# Patient Record
Sex: MALE | URBAN - METROPOLITAN AREA
[De-identification: names, ages, dates, MRNs, and addresses within clinical notes are randomized per-mention and may not be internally consistent; named-entity substitution may affect disease eponyms.]

---

## 2017-03-28 ENCOUNTER — OFFICE VISIT (OUTPATIENT)
Dept: PODIATRY | Facility: CLINIC | Age: 27
End: 2017-03-28
Payer: COMMERCIAL

## 2017-03-28 VITALS — BODY MASS INDEX: 23.62 KG/M2 | HEIGHT: 70 IN | HEART RATE: 86 BPM | WEIGHT: 165 LBS

## 2017-03-28 DIAGNOSIS — L60.0 INGROWING NAIL: Primary | ICD-10-CM

## 2017-03-28 PROCEDURE — 11730 AVULSION NAIL PLATE SIMPLE 1: CPT | Mod: T5 | Performed by: PODIATRIST

## 2017-03-28 PROCEDURE — 99203 OFFICE O/P NEW LOW 30 MIN: CPT | Mod: 25 | Performed by: PODIATRIST

## 2017-03-28 NOTE — PATIENT INSTRUCTIONS
DR. HEART'S CLINIC LOCATIONS:       Monday Tuesday   [facility]   [address] [address] [address] [facility] [address] [facility], [address]   [place] [address] [place] [address]   [phone]       Wednesday:  Surgery Day    Surgery Scheduling Line - [phone]       Thursday Morning Thursday Afternoon   [facility]   [address] [facility] [address] [address] [facility], [address]   [place] [address] [place] [address]   [phone]       Friday Morning To Schedule an Appointment    [facility] Call: [phone] [address] [place] [address] [phone] PLEASE FAX ALL FORMS TO: [phone]     Follow up: 2 weeks or as needed    INGROWN TOENAIL PROCEDURE INSTRUCTIONS  - After the procedure, go home and elevate the involved foot for the remainder of the day/evening as able.  This is to minimize swelling, control pain, and limit post-procedural complications.  The pre-procedural injection may cause your toe to be numb anywhere from1-2 hours.    - You can take Tylenol, Ibuprofen, Advil, etc as needed for pain if tolerated.  Follow label instructions      - If you have been given a prescription for antibiotics, take them as instructed and complete the prescription.    - Keep dressing intact until the following morning.  At that point, you may remove the bandage (you may need to soak it in warm soapy water as the bandage will likely adhere to your skin).  Soaking in warm soapy water for 5-10 minutes will also facilitate healing.  Wash the toe thoroughly, dry the toe thoroughly.  Apply antibiotic wound ointment to base of wound and cover with gauze and Coban dressing (not too tightly) until it stops draining.  This may take a few days to weeks, but at that point, you may continue with antibiotic ointment and a band-aid, or you may stop applying a dressing all together.  Dressing changes should  be done twice daily if you had the permanent/chemical procedure done.    - You may do activities to tolerance the following day.  Find a shoe that is comfortable and minimizes the amount of rubbing on your toe, as this may increase pain, swelling, etc.    - Monitor for signs of infection.  With this procedure, it is common to have mild surrounding redness and drainage.  If the redness involves the entire great toe or if you notice red streaks on top of your foot, or if you experience any nausea, vomiting, chills, fevers > 101 degrees, call clinic for a quick appointment.      Body Mass Index (BMI)    Many things can cause foot and ankle problems. Foot structure, activity level, foot mechanics and injuries are common causes of pain.    One very important issue that often goes unmentioned, is body weight.  Extra weight can cause increased stress on muscles, ligaments, bones and tendons. Sometimes just a few extra pounds is all it takes to put one over her/his threshold. Without reducing that stress, it can be difficult to alleviate pain.      Some people are uncomfortable addressing this issue, but we feel it is important for you to think about it. As Foot & Ankle specialists, our job is addressing the lower extremity problem and possible causes.     Regarding extra body weight, we encourage patients to discuss diet and weight management plans with their primary care doctors. It is this team approach that gives you the best opportunity for pain relief and getting you back on your feet.

## 2017-03-28 NOTE — PROGRESS NOTES
"Foot & Ankle Surgery  March 28, 2017    CC: R hallux pain    I was asked to see Jose Atwood regarding the chief complaint by:  none    HPI:  Pt is a 26 year old male who presents with above complaint.  R hallux pain x 3 weeks, thinks he has an ingrown nail.  He tried to remove this himself but thinks he may have caused an infection.  Pain 8/10 \"constantly\", brianna with pressure.  He's tried peroxide and soaking.  No history of ingrowns, infections or procedures previously    ROS:   Pos for CC.  The patient denies current nausea, vomiting, chills, fevers, belly pain, calf pain, chest pain or SOB.  Complete remainder of ROS is otherwise neg.    VITALS:    Vitals:    03/28/17 1407   Pulse: 86   Weight: 165 lb (74.8 kg)   Height: 5' 10\" (1.778 m)       PMH:  No past medical history on file.    SXHX:  No past surgical history on file.     MEDS:    No current outpatient prescriptions on file.     No current facility-administered medications for this visit.        ALL:   Not on File    FMH:  No family history on file.    SocHx:    Social History     Social History     Marital status: Single     Spouse name: N/A     Number of children: N/A     Years of education: N/A     Occupational History     Not on file.     Social History Main Topics     Smoking status: Never Smoker     Smokeless tobacco: Not on file     Alcohol use Not on file     Drug use: Not on file     Sexual activity: Not on file     Other Topics Concern     Not on file     Social History Narrative     No narrative on file           EXAMINATION:  Gen:   No apparent distress  Neuro:   A&Ox3, no deficits  Psych:    Answering questions appropriately for age and situation with normal affect  Head:    NCAT  Eye:    Visual scanning without deficit  Ear:    Response to auditory stimuli wnl  Lung:    Non-labored breathing on RA noted  Abd:    NTND per patient report  Lymph:    Neg for pitting/non-pitting edema BLE  Vasc:    Pulses palpable, CFT minimally delayed  Neuro:    " Light touch sensation intact to all sensory nerve distributions without paresthesias  Derm:    Incurvated medial R hallux nail border, slight inflammation, no purulence.    MSK:    ROM, strength wnl without limitation, no pain on palpation noted.  Calf:    Neg for redness, swelling or tenderness    Assessment:  26 year old male with ingrown medial R hallux      Plan:  Discussed etiologies and options  1.  Ingrown medial R hallux  -Regarding the ingrown nail, procedure options were discussed.  They elected to go with Partial temporary avulsion.  See procedure note for details.  Risks that were discussed include but are not limited to infection, wound healing complications, nerve irritation, recurrence of the ingrown nail and the need for further procedures.  Follow up 2 weeks for re-evaluation or sooner with acute issues.  Antibiotic:  None needed    After discussing the procedure, as well as risks, complications and post-procedure instructions, informed consent was obtained.    Anesthesia:  4 cc's of  1% lidocaine plain    Procedure:  After adequate prep, and with anesthesia achieved,  attention was directed to the medial border of the R hallux where the nail plate was freed from surrounding soft tissue.  The offending border was  using an English Anvil and then removed in total.  The base of the wound was explored and showed no necrotic tissue, purulence or debris.   A clean dressing was applied loosely to prevent vascular insult.  The patient tolerated the procedure well without complications.    Post-procedural instructions were dispensed and discussed with the patient.  All questions were answered.    Follow up:  2 weeks      Patient's medical history was reviewed today    Body mass index is 23.68 kg/(m^2).          Joe Garcia DPM   Podiatric Foot & Ankle Surgeon  [facility]  [phone]

## 2017-03-28 NOTE — NURSING NOTE
"Chief Complaint   Patient presents with     Ingrown Toenail     L hallux, medial edge; tried cutting it himself and things he caused an infection       Initial Pulse 86  Ht 5' 10\" (1.778 m)  Wt 165 lb (74.8 kg)  BMI 23.68 kg/m2 Estimated body mass index is 23.68 kg/(m^2) as calculated from the following:    Height as of this encounter: 5' 10\" (1.778 m).    Weight as of this encounter: 165 lb (74.8 kg).  Medication Reconciliation: complete  "

## 2017-03-28 NOTE — MR AVS SNAPSHOT
After Visit Summary   3/28/2017    Jose Atwood    MRN: [id number]           Patient Information     Date Of Birth          1990        Visit Information        Provider Department      3/28/2017 2:15 PM Joe Heart DPM [facility]        Care Instructions      DR. HEART'S CLINIC LOCATIONS:       Monday Tuesday   [facility]   [address] [address] [address] [facility], [address]   [place] [address] [place] [address]   [phone]       Wednesday:  Surgery Day    Surgery Scheduling Line - [phone]       Thursday Morning Thursday Afternoon   [facility]   [address] [facility], [address]   [place] [address] [place] [address]   [phone]       Friday Morning To Schedule an Appointment    [facility] Call: [phone] [address] [place] [address]  [phone] PLEASE FAX ALL FORMS TO: [phone]     Follow up: 2 weeks or as needed    INGROWN TOENAIL PROCEDURE INSTRUCTIONS  - After the procedure, go home and elevate the involved foot for the remainder of the day/evening as able.  This is to minimize swelling, control pain, and limit post-procedural complications.  The pre-procedural injection may cause your toe to be numb anywhere from1-2 hours.    - You can take Tylenol, Ibuprofen, Advil, etc as needed for pain if tolerated.  Follow label instructions      - If you have been given a prescription for antibiotics, take them as instructed and complete the prescription.    - Keep dressing intact until the following morning.  At that point, you may remove the bandage (you may need to soak it in warm soapy water as the bandage will likely adhere to your skin).  Soaking in warm soapy water for 5-10 minutes will also facilitate healing.  Wash the toe thoroughly, dry the toe thoroughly.  Apply antibiotic  wound ointment to base of wound and cover with gauze and Coban dressing (not too tightly) until it stops draining.  This may take a few days to weeks, but at that point, you may continue with antibiotic ointment and a band-aid, or you may stop applying a dressing all together.  Dressing changes should be done twice daily if you had the permanent/chemical procedure done.    - You may do activities to tolerance the following day.  Find a shoe that is comfortable and minimizes the amount of rubbing on your toe, as this may increase pain, swelling, etc.    - Monitor for signs of infection.  With this procedure, it is common to have mild surrounding redness and drainage.  If the redness involves the entire great toe or if you notice red streaks on top of your foot, or if you experience any nausea, vomiting, chills, fevers > 101 degrees, call clinic for a quick appointment.      Body Mass Index (BMI)    Many things can cause foot and ankle problems. Foot structure, activity level, foot mechanics and injuries are common causes of pain.    One very important issue that often goes unmentioned, is body weight.  Extra weight can cause increased stress on muscles, ligaments, bones and tendons. Sometimes just a few extra pounds is all it takes to put one over her/his threshold. Without reducing that stress, it can be difficult to alleviate pain.      Some people are uncomfortable addressing this issue, but we feel it is important for you to think about it. As Foot & Ankle specialists, our job is addressing the lower extremity problem and possible causes.     Regarding extra body weight, we encourage patients to discuss diet and weight management plans with their primary care doctors. It is this team approach that gives you the best opportunity for pain relief and getting you back on your feet.                  Follow-ups after your visit        Who to contact     If you have questions or need follow up information about today's  "clinic visit or your schedule please contact [facility] directly at [phone].  Normal or non-critical lab and imaging results will be communicated to you by MyChart, letter or phone within 4 business days after the clinic has received the results. If you do not hear from us within 7 days, please contact the clinic through MyChart or phone. If you have a critical or abnormal lab result, we will notify you by phone as soon as possible.  Submit refill requests through [employer] or call your pharmacy and they will forward the refill request to us. Please allow 3 business days for your refill to be completed.          Additional Information About Your Visit        [employer] Information     [employer] lets you send messages to your doctor, view your test results, renew your prescriptions, schedule appointments and more. To sign up, go to [email or web address] . Click on \"Log in\" on the left side of the screen, which will take you to the Welcome page. Then click on \"Sign up Now\" on the right side of the page.     You will be asked to enter the access code listed below, as well as some personal information. Please follow the directions to create your username and password.     Your access code is: [email or web address]  Expires: 2017  2:15 PM     Your access code will  in 90 days. If you need help or a new code, please call your [facility] clinic or [phone].        Care EveryWhere ID     This is your Care EveryWhere ID. This could be used by other organizations to access your [facility] medical records  [id number]        Your Vitals Were     Pulse Height BMI (Body Mass Index)             86 5' 10\" (1.778 m) 23.68 kg/m2          Blood Pressure from Last 3 Encounters:   No data found for BP    Weight from Last 3 Encounters:   17 165 lb (74.8 kg)              Today, you had the following     No orders found for display       Primary Care Provider    None Specified       No primary provider on file.      "   Thank you!     Thank you for choosing [facility]  for your care. Our goal is always to provide you with excellent care. Hearing back from our patients is one way we can continue to improve our services. Please take a few minutes to complete the written survey that you may receive in the mail after your visit with us. Thank you!             Your Updated Medication List - Protect others around you: Learn how to safely use, store and throw away your medicines at www.disposemymeds.org.      Notice  As of 3/28/2017  2:15 PM    You have not been prescribed any medications.

## 2017-12-28 ENCOUNTER — APPOINTMENT (OUTPATIENT)
Dept: CT IMAGING | Facility: CLINIC | Age: 27
End: 2017-12-28
Attending: EMERGENCY MEDICINE
Payer: COMMERCIAL

## 2017-12-28 ENCOUNTER — HOSPITAL ENCOUNTER (EMERGENCY)
Facility: CLINIC | Age: 27
Discharge: HOME | End: 2017-12-28
Attending: EMERGENCY MEDICINE | Admitting: EMERGENCY MEDICINE
Payer: COMMERCIAL

## 2017-12-28 VITALS
HEART RATE: 66 BPM | RESPIRATION RATE: 16 BRPM | BODY MASS INDEX: 25.05 KG/M2 | OXYGEN SATURATION: 98 % | DIASTOLIC BLOOD PRESSURE: 97 MMHG | HEIGHT: 70 IN | TEMPERATURE: 99.2 F | SYSTOLIC BLOOD PRESSURE: 131 MMHG | WEIGHT: 175 LBS

## 2017-12-28 DIAGNOSIS — R04.2 HEMOPTYSIS: ICD-10-CM

## 2017-12-28 LAB
ANION GAP SERPL CALCULATED.3IONS-SCNC: 8 MMOL/L (ref 3–14)
APTT PPP: 30 SEC (ref 22–37)
BASOPHILS # BLD AUTO: 0 10E9/L (ref 0–0.2)
BASOPHILS NFR BLD AUTO: 0.3 %
BUN SERPL-MCNC: 12 MG/DL (ref 7–30)
CALCIUM SERPL-MCNC: 9.3 MG/DL (ref 8.5–10.1)
CHLORIDE SERPL-SCNC: 107 MMOL/L (ref 94–109)
CO2 SERPL-SCNC: 24 MMOL/L (ref 20–32)
CREAT SERPL-MCNC: 0.89 MG/DL (ref 0.66–1.25)
DIFFERENTIAL METHOD BLD: NORMAL
EOSINOPHIL # BLD AUTO: 0.1 10E9/L (ref 0–0.7)
EOSINOPHIL NFR BLD AUTO: 0.5 %
ERYTHROCYTE [DISTWIDTH] IN BLOOD BY AUTOMATED COUNT: 12.2 % (ref 10–15)
GFR SERPL CREATININE-BSD FRML MDRD: >90 ML/MIN/1.7M2
GLUCOSE SERPL-MCNC: 94 MG/DL (ref 70–99)
HCT VFR BLD AUTO: 46.5 % (ref 40–53)
HGB BLD-MCNC: 16.1 G/DL (ref 13.3–17.7)
IMM GRANULOCYTES # BLD: 0.1 10E9/L (ref 0–0.4)
IMM GRANULOCYTES NFR BLD: 0.5 %
INR PPP: 0.96 (ref 0.86–1.14)
LYMPHOCYTES # BLD AUTO: 4.1 10E9/L (ref 0.8–5.3)
LYMPHOCYTES NFR BLD AUTO: 39.9 %
MCH RBC QN AUTO: 30.4 PG (ref 26.5–33)
MCHC RBC AUTO-ENTMCNC: 34.6 G/DL (ref 31.5–36.5)
MCV RBC AUTO: 88 FL (ref 78–100)
MONOCYTES # BLD AUTO: 0.8 10E9/L (ref 0–1.3)
MONOCYTES NFR BLD AUTO: 8.1 %
NEUTROPHILS # BLD AUTO: 5.2 10E9/L (ref 1.6–8.3)
NEUTROPHILS NFR BLD AUTO: 50.7 %
NRBC # BLD AUTO: 0 10*3/UL
NRBC BLD AUTO-RTO: 0 /100
PLATELET # BLD AUTO: 373 10E9/L (ref 150–450)
POTASSIUM SERPL-SCNC: 3.4 MMOL/L (ref 3.4–5.3)
RBC # BLD AUTO: 5.3 10E12/L (ref 4.4–5.9)
SODIUM SERPL-SCNC: 139 MMOL/L (ref 133–144)
WBC # BLD AUTO: 10.3 10E9/L (ref 4–11)

## 2017-12-28 PROCEDURE — 96360 HYDRATION IV INFUSION INIT: CPT | Mod: 59

## 2017-12-28 PROCEDURE — 25000128 H RX IP 250 OP 636: Performed by: EMERGENCY MEDICINE

## 2017-12-28 PROCEDURE — 99285 EMERGENCY DEPT VISIT HI MDM: CPT | Mod: 25

## 2017-12-28 PROCEDURE — 96361 HYDRATE IV INFUSION ADD-ON: CPT

## 2017-12-28 PROCEDURE — 85610 PROTHROMBIN TIME: CPT | Performed by: EMERGENCY MEDICINE

## 2017-12-28 PROCEDURE — 85025 COMPLETE CBC W/AUTO DIFF WBC: CPT | Performed by: EMERGENCY MEDICINE

## 2017-12-28 PROCEDURE — 71260 CT THORAX DX C+: CPT

## 2017-12-28 PROCEDURE — 85730 THROMBOPLASTIN TIME PARTIAL: CPT | Performed by: EMERGENCY MEDICINE

## 2017-12-28 PROCEDURE — 80048 BASIC METABOLIC PNL TOTAL CA: CPT | Performed by: EMERGENCY MEDICINE

## 2017-12-28 RX ORDER — LIDOCAINE 40 MG/G
CREAM TOPICAL
Status: DISCONTINUED | OUTPATIENT
Start: 2017-12-28 | End: 2017-12-28 | Stop reason: HOSPADM

## 2017-12-28 RX ORDER — IOPAMIDOL 755 MG/ML
500 INJECTION, SOLUTION INTRAVASCULAR ONCE
Status: COMPLETED | OUTPATIENT
Start: 2017-12-28 | End: 2017-12-28

## 2017-12-28 RX ADMIN — SODIUM CHLORIDE 1000 ML: 9 INJECTION, SOLUTION INTRAVENOUS at 02:23

## 2017-12-28 RX ADMIN — SODIUM CHLORIDE 1000 ML: 9 INJECTION, SOLUTION INTRAVENOUS at 02:51

## 2017-12-28 RX ADMIN — IOPAMIDOL 71 ML: 755 INJECTION, SOLUTION INTRAVENOUS at 02:51

## 2017-12-28 ASSESSMENT — ENCOUNTER SYMPTOMS
SHORTNESS OF BREATH: 1
COUGH: 1

## 2017-12-28 NOTE — DISCHARGE INSTRUCTIONS
Hemoptysis    Hemoptysis is the medical term for coughing up blood. There are many causes for this, including minor illnesses like bronchitis. Hemoptysis can also be an early sign of a more serious illness, like a blood clot in the lung (pulmonary embolism), cancer, tuberculosis, or pneumonia.  Less common causes of hemoptysis can be hard to diagnose in an emergency department or a clinic. More testing will be needed if the symptoms continue.  Home care    Stay away from cigarette smoke. Smoke irritates the bronchial passages.    Unless you are taking daily aspirin to prevent stroke or heart attack, do not take aspirin or products that contain aspirin. Aspirin affects how readily the blood clots. Medicines that prevent clotting may make hemoptysis worse.    If you have a lung infection, drinking extra fluid will help loosen secretions in the lungs.    Over-the-counter cough medicines that contain dextromethorphan may help reduce coughing. Check with a medical provider before taking dextromethorphan if you have a chronic illness, are pregnant, or take daily medications.    If you were prescribed an antibiotic, take it until it is all gone. Take it even if you are feeling better after only a few days.  Follow-up care  Follow up with your health care provider, or as advised.  When to seek medical advice  Call your healthcare provider right away if any of these occur:    Fever of 100.4 F (38 C) or higher  Call 911, or get immediate medical care  Call emergency services right away if any of these occur:    Coughing up an increased amount of blood    Trouble breathing, wheezing, or pain with breathing    Chest pain or chest pressure    Fainting or losing consciousness    Rapid heartbeat    Weakness or dizziness  Date Last Reviewed: 9/13/2015 2000-2017 [employer]. [address] [place] [address], [place] [address]. All rights reserved. This information is not intended as a substitute for professional medical  care. Always follow your healthcare professional's instructions.

## 2017-12-28 NOTE — ED PROVIDER NOTES
"  History     Chief Complaint:  Hemoptysis    HPI   Jose Atwood is a 27 year old male who presents to the emergency department today for evaluation of hemoptysis. The patient woke up at 0100 this morning coughing up large amounts of blood. He has since stopped coughing up blood and has instead been coughing up mucous since then. He states that his bout of cough was sudden as he has not had a cough over the past couple of days. He also complains of worsening shortness of breath since onset of cough. He has not had any recent long travel. He has not noticed any recent bruising. He does not have a personal or a family history of clotting disorders.    Allergies:  Drug allergies reviewed. No pertinent drug allergies.     Medications:    Medications reviewed. No pertinent medications.     Past Medical History:    History reviewed. No pertinent past medical history.    Past Surgical History:    History reviewed. No pertinent surgical history.    Family History:    Family history reviewed. No pertinent family history.     Social History:  The patient was accompanied to the ED by family.  Smoking Status: Never Smoker  Smokeless Tobacco: Never Used  Alcohol Use: Positive  Marital Status:  Single     Review of Systems   Respiratory: Positive for cough and shortness of breath.    All other systems reviewed and are negative.    Physical Exam     Patient Vitals for the past 24 hrs:   BP Temp Temp src Pulse Heart Rate Resp SpO2 Height Weight   12/28/17 0408 - - - - - 16 98 % - -   12/28/17 0327 - - - - - - 98 % - -   12/28/17 0326 - - - - - - 97 % - -   12/28/17 0325 - - - - - - 98 % - -   12/28/17 0323 - - - - - - 97 % - -   12/28/17 0322 - - - - - - 98 % - -   12/28/17 0321 - - - - - - 99 % - -   12/28/17 0230 (!) 131/97 - - - 65 15 100 % - -   12/28/17 0143 (!) 134/93 99.2  F (37.3  C) Temporal 66 - 20 98 % 1.778 m (5' 10\") 79.4 kg (175 lb)     Physical Exam  Nursing note and vitals reviewed.  Constitutional: Cooperative. "   HENT:   Mouth/Throat: Mucous membranes are normal.   Cardiovascular: Normal rate, regular rhythm and normal heart sounds.  No murmur.  Pulmonary/Chest: Effort normal and breath sounds normal. No respiratory distress. No wheezes. No rales.   Abdominal: Soft. Normal appearance. There is no tenderness. There is no rigidity and no guarding.   Musculoskeletal: No LE edema.   Neurological: Alert.   Skin: Skin is warm and dry.   Psychiatric: Normal mood and affect.     Emergency Department Course     Imaging:  Radiology findings were communicated with the patient who voiced understanding of the findings.    CT Chest Pulmonary Embolism w Contrast  IMPRESSION: There is no pulmonary embolus, aortic aneurysm or dissection. No acute abnormality.    Laboratory:  Laboratory findings were communicated with the patient who voiced understanding of the findings.    CBC: WBC 10.3, HGB 16.1,   BMP: WNL (Creatinine 0.89)    INR: 0.96  Partial thromboplastin time: 30    Interventions:  0223 NS 1000 mL IV    Emergency Department Course:    Nursing notes and vitals reviewed.    0200 I performed an exam of the patient as documented above.     IV was inserted and blood was drawn for laboratory testing, results above.    The patient was sent for a chest pulmonary embolism CT while in the emergency department, results above.     0325 I rechecked the patient.    I personally reviewed the lab and imaging results with the patient and answered all related questions prior to discharge.    I discussed the treatment plan with the patient. They expressed understanding of this plan and consented to discharge. They will be discharged home with instructions for care and follow up. In addition, the patient will return to the emergency department if their symptoms persist, worsen, if new symptoms arise or if there is any concern.  All questions were answered.     Impression & Plan      Medical Decision Making:  Jose Atwood is a 27 year old male  who presents to the emergency department today for evaluation of sudden onset cough and hemoptysis. Sounds like this was significant but now clearing. He has no clotting disorder or family history of clotting or bleeding disorders. He had no preceding infections. CT scan of the chest was obtained to look for a pulmonary mass lesion, arterial venous malformation, PE, pneumonia, or other acute abnormalities. This is normal. His labs are reassuring. He has had no more hematemesis in his 2 hour stay in the emergency department. Vitals are reassuring. At this time he is safe for discharge with appropriate return precautions.     Diagnosis:    ICD-10-CM    1. Hemoptysis R04.2      Disposition:   The patient is discharged to home.    Scribe Disclosure:  Ofe HENRY, am serving as a scribe at 2:05 AM on 12/28/2017 to document services personally performed by Angel Knott MD, based on my observations and the provider's statements to me.      [facility] EMERGENCY DEPARTMENT       Angel Knott MD  12/28/17 0449

## 2017-12-28 NOTE — ED AVS SNAPSHOT
[facility] Emergency Department    [address] [place] [address]    Phone:  [phone]    Fax:  [phone]                                       Jose Atwood   MRN: [id number]    Department:  [facility] Emergency Department   Date of Visit:  12/28/2017           Patient Information     Date Of Birth          1990        Your diagnoses for this visit were:     Hemoptysis        You were seen by Angel Knott MD.      Follow-up Information     Follow up with PCP as needed.        Discharge Instructions           Hemoptysis    Hemoptysis is the medical term for coughing up blood. There are many causes for this, including minor illnesses like bronchitis. Hemoptysis can also be an early sign of a more serious illness, like a blood clot in the lung (pulmonary embolism), cancer, tuberculosis, or pneumonia.  Less common causes of hemoptysis can be hard to diagnose in an emergency department or a clinic. More testing will be needed if the symptoms continue.  Home care    Stay away from cigarette smoke. Smoke irritates the bronchial passages.    Unless you are taking daily aspirin to prevent stroke or heart attack, do not take aspirin or products that contain aspirin. Aspirin affects how readily the blood clots. Medicines that prevent clotting may make hemoptysis worse.    If you have a lung infection, drinking extra fluid will help loosen secretions in the lungs.    Over-the-counter cough medicines that contain dextromethorphan may help reduce coughing. Check with a medical provider before taking dextromethorphan if you have a chronic illness, are pregnant, or take daily medications.    If you were prescribed an antibiotic, take it until it is all gone. Take it even if you are feeling better after only a few days.  Follow-up care  Follow up with your health care provider, or as advised.  When to seek medical advice  Call your healthcare provider right away if any of  these occur:    Fever of 100.4 F (38 C) or higher  Call 911, or get immediate medical care  Call emergency services right away if any of these occur:    Coughing up an increased amount of blood    Trouble breathing, wheezing, or pain with breathing    Chest pain or chest pressure    Fainting or losing consciousness    Rapid heartbeat    Weakness or dizziness  Date Last Reviewed: 9/13/2015 2000-2017 [employer] [address] [place] [address]. All rights reserved. This information is not intended as a substitute for professional medical care. Always follow your healthcare professional's instructions.            24 Hour Appointment Hotline       To make an appointment at any [facility], call [phone] ([phone]). If you don't have a family doctor or clinic, we will help you find one. [facility] are conveniently located to serve the needs of you and your family.             Review of your medicines      Notice     You have not been prescribed any medications.            Procedures and tests performed during your visit     Basic metabolic panel    CBC with platelets differential    CT Chest Pulmonary Embolism w Contrast    INR    Partial thromboplastin time      Orders Needing Specimen Collection     None      Pending Results     Date and Time Order Name Status Description    12/28/2017 0209 CT Chest Pulmonary Embolism w Contrast Preliminary             Pending Culture Results     No orders found from 12/26/2017 to 12/29/2017.            Pending Results Instructions     If you had any lab results that were not finalized at the time of your Discharge, you can call the ED Lab Result RN at [phone]. You will be contacted by this team for any positive Lab results or changes in treatment. The nurses are available 7 days a week from 10A to 6:30P.  You can leave a message 24 hours per day and they will return your call.        Test Results From Your Hospital Stay         12/28/2017  2:34 AM      Component Results     Component Value Ref Range & Units Status    WBC 10.3 4.0 - 11.0 10e9/L Final    RBC Count 5.30 4.4 - 5.9 10e12/L Final    Hemoglobin 16.1 13.3 - 17.7 g/dL Final    Hematocrit 46.5 40.0 - 53.0 % Final    MCV 88 78 - 100 fl Final    MCH 30.4 26.5 - 33.0 pg Final    MCHC 34.6 31.5 - 36.5 g/dL Final    RDW 12.2 10.0 - 15.0 % Final    Platelet Count 373 150 - 450 10e9/L Final    Diff Method Automated Method  Final    % Neutrophils 50.7 % Final    % Lymphocytes 39.9 % Final    % Monocytes 8.1 % Final    % Eosinophils 0.5 % Final    % Basophils 0.3 % Final    % Immature Granulocytes 0.5 % Final    Nucleated RBCs 0 0 /100 Final    Absolute Neutrophil 5.2 1.6 - 8.3 10e9/L Final    Absolute Lymphocytes 4.1 0.8 - 5.3 10e9/L Final    Absolute Monocytes 0.8 0.0 - 1.3 10e9/L Final    Absolute Eosinophils 0.1 0.0 - 0.7 10e9/L Final    Absolute Basophils 0.0 0.0 - 0.2 10e9/L Final    Abs Immature Granulocytes 0.1 0 - 0.4 10e9/L Final    Absolute Nucleated RBC 0.0  Final         12/28/2017  2:43 AM      Component Results     Component Value Ref Range & Units Status    INR 0.96 0.86 - 1.14 Final         12/28/2017  2:43 AM      Component Results     Component Value Ref Range & Units Status    PTT 30 22 - 37 sec Final         12/28/2017  2:48 AM      Component Results     Component Value Ref Range & Units Status    Sodium 139 133 - 144 mmol/L Final    Potassium 3.4 3.4 - 5.3 mmol/L Final    Chloride 107 94 - 109 mmol/L Final    Carbon Dioxide 24 20 - 32 mmol/L Final    Anion Gap 8 3 - 14 mmol/L Final    Glucose 94 70 - 99 mg/dL Final    Urea Nitrogen 12 7 - 30 mg/dL Final    Creatinine 0.89 0.66 - 1.25 mg/dL Final    GFR Estimate >90 >60 mL/min/1.7m2 Final    Non  GFR Calc    GFR Estimate If Black >90 >60 mL/min/1.7m2 Final    African American GFR Calc    Calcium 9.3 8.5 - 10.1 mg/dL Final         12/28/2017  3:26 AM      Narrative     CT CHEST PULMONARY EMBOLISM W  CONTRAST  12/28/2017 2:59 AM      HISTORY: Dyspnea, hemoptysis.      TECHNIQUE: CT chest with intravenous contrast using the pulmonary  embolus protocol. Radiation dose for this scan was reduced using  automated exposure control, adjustment of the mA and/or kV according  to patient size, or iterative reconstruction technique. 71 mL  [id number].     COMPARISON: None.    FINDINGS: Evaluation of the pulmonary arterial system shows no  evidence of embolus. There is no aortic aneurysm or dissection. The  heart size is normal. There is no mediastinal, hilar or axillary lymph  node enlargement. Calcified lymph nodes in the right hilum and  mediastinum. There are calcified granulomas in the right middle lobe  laterally. The lungs are otherwise clear. No pneumothorax or pleural  effusion. Images through the upper abdomen show no acute  abnormalities. There are calcified granulomas in the spleen.        Impression     IMPRESSION: There is no pulmonary embolus, aortic aneurysm or  dissection. No acute abnormality.                Clinical Quality Measure: Blood Pressure Screening     Your blood pressure was checked while you were in the emergency department today. The last reading we obtained was  BP: (!) 131/97 . Please read the guidelines below about what these numbers mean and what you should do about them.  If your systolic blood pressure (the top number) is less than 120 and your diastolic blood pressure (the bottom number) is less than 80, then your blood pressure is normal. There is nothing more that you need to do about it.  If your systolic blood pressure (the top number) is 120-139 or your diastolic blood pressure (the bottom number) is 80-89, your blood pressure may be higher than it should be. You should have your blood pressure rechecked within a year by a primary care provider.  If your systolic blood pressure (the top number) is 140 or greater or your diastolic blood pressure (the bottom number) is 90 or greater,  "you may have high blood pressure. High blood pressure is treatable, but if left untreated over time it can put you at risk for heart attack, stroke, or kidney failure. You should have your blood pressure rechecked by a primary care provider within the next 4 weeks.  If your provider in the emergency department today gave you specific instructions to follow-up with your doctor or provider even sooner than that, you should follow that instruction and not wait for up to 4 weeks for your follow-up visit.        Thank you for choosing [facility]       Thank you for choosing [facility] for your care. Our goal is always to provide you with excellent care. Hearing back from our patients is one way we can continue to improve our services. Please take a few minutes to complete the written survey that you may receive in the mail after you visit with us. Thank you!        [facility] Information     [facility] lets you send messages to your doctor, view your test results, renew your prescriptions, schedule appointments and more. To sign up, go to [email or web address] . Click on \"Log in\" on the left side of the screen, which will take you to the Welcome page. Then click on \"Sign up Now\" on the right side of the page.     You will be asked to enter the access code listed below, as well as some personal information. Please follow the directions to create your username and password.     Your access code is: [email or web address]  Expires: 3/28/2018  4:07 AM     Your access code will  in 90 days. If you need help or a new code, please call your [facility] clinic or [phone].        Care EveryWhere ID     This is your Care EveryWhere ID. This could be used by other organizations to access your [facility] medical records  [id number]        Equal Access to Services     [facility] : jessie Pierce, wilber henson. So [facility] [phone].    ATENCIÓN: Si habla " español, tiene a gutierrez disposición servicios gratuitos de asistencia lingüística. José Miguel al [phone].    We comply with applicable federal civil rights laws and [place] laws. We do not discriminate on the basis of race, color, national origin, age, disability, sex, sexual orientation, or gender identity.            After Visit Summary       This is your record. Keep this with you and show to your community pharmacist(s) and doctor(s) at your next visit.

## 2017-12-28 NOTE — ED AVS SNAPSHOT
[facility] Emergency Department    [address]    [facility] [address]    Phone:  [phone]    Fax:  [phone]                                       Jose Atwood   MRN: [id number]    Department:  [facility] Emergency Department   Date of Visit:  12/28/2017           After Visit Summary Signature Page     I have received my discharge instructions, and my questions have been answered. I have discussed any challenges I see with this plan with the nurse or doctor.    ..........................................................................................................................................  Patient/Patient Representative Signature      ..........................................................................................................................................  Patient Representative Print Name and Relationship to Patient    ..................................................               ................................................  Date                                            Time    ..........................................................................................................................................  Reviewed by Signature/Title    ...................................................              ..............................................  Date                                                            Time

## 2018-09-19 ENCOUNTER — ALLIED HEALTH/NURSE VISIT (OUTPATIENT)
Dept: NURSING | Facility: CLINIC | Age: 28
End: 2018-09-19
Payer: COMMERCIAL

## 2018-09-19 DIAGNOSIS — Z23 NEED FOR PROPHYLACTIC VACCINATION AND INOCULATION AGAINST INFLUENZA: Primary | ICD-10-CM

## 2018-09-19 PROCEDURE — 90686 IIV4 VACC NO PRSV 0.5 ML IM: CPT

## 2018-09-19 PROCEDURE — 90471 IMMUNIZATION ADMIN: CPT

## 2018-09-19 PROCEDURE — 99207 ZZC NO CHARGE NURSE ONLY: CPT

## 2018-09-19 NOTE — MR AVS SNAPSHOT
"              After Visit Summary   2018    Jose Atwood    MRN: [id number]           Patient Information     Date Of Birth          1990        Visit Information        Provider Department      2018 10:30 AM AMAYA NURSE AB [facility]        Today's Diagnoses     Need for prophylactic vaccination and inoculation against influenza    -  1       Follow-ups after your visit        Who to contact     If you have questions or need follow up information about today's clinic visit or your schedule please [facility] directly at [phone].  Normal or non-critical lab and imaging results will be communicated to you by MyChart, letter or phone within 4 business days after the clinic has received the results. If you do not hear from us within 7 days, please contact the clinic through [employer] or phone. If you have a critical or abnormal lab result, we will notify you by phone as soon as possible.  Submit refill requests through [employer] or call your pharmacy and they will forward the refill request to us. Please allow 3 business days for your refill to be completed.          Additional Information About Your Visit        MyChart Information     [employer] lets you send messages to your doctor, view your test results, renew your prescriptions, schedule appointments and more. To sign up, go to [email or web address] . Click on \"Log in\" on the left side of the screen, which will take you to the Welcome page. Then click on \"Sign up Now\" on the right side of the page.     You will be asked to enter the access code listed below, as well as some personal information. Please follow the directions to create your username and password.     Your access code is: [email or web address]  Expires: 2018 11:19 AM     Your access code will  in 90 days. If you need help or a new code, please call your [facility] or [phone].        Care EveryWhere ID     This is your Care EveryWhere ID. This " could be used by other organizations to access your [facility] medical records  [id number]         Blood Pressure from Last 3 Encounters:   12/28/17 (!) 131/97    Weight from Last 3 Encounters:   12/28/17 175 lb (79.4 kg)   03/28/17 165 lb (74.8 kg)              We Performed the Following     FLU VACCINE, SPLIT VIRUS, IM (QUADRIVALENT) [46665]- >3 YRS     Vaccine Administration, Initial [59460]        Primary Care Provider Fax #    Physician No Ref-Primary [phone]       No address on file        Equal Access to Services     [facility]: Hadii kristian Mchugh, wavaleriano sher, peter portilloaljoellen treviño, wilber quinones . So [facility] [phone].    ATENCIÓN: Si habla español, tiene a gutierrez disposición servicios gratuitos de asistencia lingüística. [facility] [phone].    We comply with applicable federal civil rights laws and [place] laws. We do not discriminate on the basis of race, color, national origin, age, disability, sex, sexual orientation, or gender identity.            Thank you!     Thank you for choosing [facility]  for your care. Our goal is always to provide you with excellent care. Hearing back from our patients is one way we can continue to improve our services. Please take a few minutes to complete the written survey that you may receive in the mail after your visit with us. Thank you!             Your Updated Medication List - Protect others around you: Learn how to safely use, store and throw away your medicines at www.disposemymeds.org.      Notice  As of 9/19/2018 11:19 AM    You have not been prescribed any medications.

## 2018-09-19 NOTE — PROGRESS NOTES

## 2021-06-19 ENCOUNTER — HEALTH MAINTENANCE LETTER (OUTPATIENT)
Age: 31
End: 2021-06-19

## 2021-10-09 ENCOUNTER — HEALTH MAINTENANCE LETTER (OUTPATIENT)
Age: 31
End: 2021-10-09

## 2022-07-16 ENCOUNTER — HEALTH MAINTENANCE LETTER (OUTPATIENT)
Age: 32
End: 2022-07-16

## 2022-09-11 ENCOUNTER — HEALTH MAINTENANCE LETTER (OUTPATIENT)
Age: 32
End: 2022-09-11

## 2023-07-29 ENCOUNTER — HEALTH MAINTENANCE LETTER (OUTPATIENT)
Age: 33
End: 2023-07-29